# Patient Record
Sex: MALE | Race: WHITE | NOT HISPANIC OR LATINO | URBAN - METROPOLITAN AREA
[De-identification: names, ages, dates, MRNs, and addresses within clinical notes are randomized per-mention and may not be internally consistent; named-entity substitution may affect disease eponyms.]

---

## 2020-02-10 ENCOUNTER — OFFICE VISIT (OUTPATIENT)
Dept: FAMILY MEDICINE CLINIC | Facility: CLINIC | Age: 60
End: 2020-02-10

## 2020-02-10 VITALS
BODY MASS INDEX: 34.91 KG/M2 | HEIGHT: 60 IN | HEART RATE: 68 BPM | OXYGEN SATURATION: 97 % | RESPIRATION RATE: 20 BRPM | WEIGHT: 177.8 LBS | TEMPERATURE: 98.4 F | SYSTOLIC BLOOD PRESSURE: 130 MMHG | DIASTOLIC BLOOD PRESSURE: 80 MMHG

## 2020-02-10 DIAGNOSIS — Z79.899 HIGH RISK MEDICATION USE: Primary | ICD-10-CM

## 2020-02-10 DIAGNOSIS — J44.9 CHRONIC OBSTRUCTIVE PULMONARY DISEASE, UNSPECIFIED COPD TYPE (HCC): ICD-10-CM

## 2020-02-10 DIAGNOSIS — Z82.49 FAMILY HISTORY OF EARLY CAD: ICD-10-CM

## 2020-02-10 DIAGNOSIS — F98.8 ATTENTION DEFICIT DISORDER, UNSPECIFIED HYPERACTIVITY PRESENCE: ICD-10-CM

## 2020-02-10 DIAGNOSIS — F17.210 CIGARETTE SMOKER: ICD-10-CM

## 2020-02-10 DIAGNOSIS — J30.9 ALLERGIC RHINITIS, UNSPECIFIED SEASONALITY, UNSPECIFIED TRIGGER: ICD-10-CM

## 2020-02-10 PROCEDURE — 99203 OFFICE O/P NEW LOW 30 MIN: CPT | Performed by: FAMILY MEDICINE

## 2020-02-10 RX ORDER — NAPROXEN 500 MG/1
500 TABLET ORAL
COMMUNITY
Start: 2015-05-16 | End: 2020-02-10

## 2020-02-10 RX ORDER — AZELASTINE 1 MG/ML
2 SPRAY, METERED NASAL
COMMUNITY
Start: 2019-05-10 | End: 2020-02-10

## 2020-02-10 RX ORDER — ALBUTEROL SULFATE 90 UG/1
2 AEROSOL, METERED RESPIRATORY (INHALATION)
COMMUNITY
Start: 2019-05-10 | End: 2020-02-10

## 2020-02-10 RX ORDER — FLUTICASONE PROPIONATE 50 MCG
2 SPRAY, SUSPENSION (ML) NASAL
COMMUNITY
Start: 2019-05-10 | End: 2020-02-10

## 2020-02-10 RX ORDER — SILDENAFIL CITRATE 20 MG/1
TABLET ORAL
COMMUNITY
Start: 2018-04-16 | End: 2020-02-10

## 2020-02-10 NOTE — PROGRESS NOTES
HPI  Chace Aguilar is a 59 y.o. male who is here to establish a new primary care physician.  Mainly requesting prescription for Adderall because of attention deficit disorder.  Apparently took son's Adderall and noticed significant improvement in ability to focus.  Has longstanding history of neck and back problems but has declined use of narcotic pain medication etc.  Health history form completed and reviewed.  This includes family history of mother having triple bypass surgery and pacemaker.  Patient has changed over to electronic cigarettes 1 to 2 weeks ago because of increasing congestion.  Attempted to review records from [facility] also and may try to get medications from previous physician.      Review of Systems      Past Medical History:   Diagnosis Date   • Anxiety    • Kidney stone        Past Surgical History:   Procedure Laterality Date   • KNEE ARTHROSCOPY     • ROTATOR CUFF REPAIR Bilateral     Left 3/20/1998 and Right 2/13/2002       Family History   Problem Relation Age of Onset   • Heart attack Mother    • Other Mother         Pacemaker and Triple Bypass   • Alcohol abuse Father        Social History     Socioeconomic History   • Marital status: Unknown     Spouse name: Not on file   • Number of children: Not on file   • Years of education: Not on file   • Highest education level: Not on file   Tobacco Use   • Smoking status: Current Every Day Smoker     Packs/day: 1.50     Types: Electronic Cigarette   • Smokeless tobacco: Never Used   Substance and Sexual Activity   • Alcohol use: Never     Frequency: Never   • Drug use: Never         Physical Exam   Constitutional: He is oriented to person, place, and time. He appears well-developed and well-nourished. No distress.   HENT:   Head: Normocephalic.   Nose: Nose normal.   Mouth/Throat: Oropharynx is clear and moist. No oropharyngeal exudate.   Eyes: Pupils are equal, round, and reactive to light. Conjunctivae are normal.   Neck: Normal range  of motion. No JVD present. No thyromegaly present.   Cardiovascular: Normal rate, normal heart sounds and intact distal pulses.   Pulmonary/Chest: Effort normal. No respiratory distress. He has wheezes. He has rhonchi. He has no rales.   Abdominal: He exhibits no distension. There is no tenderness.   Musculoskeletal: Normal range of motion. He exhibits no edema or deformity.   Neurological: He is alert and oriented to person, place, and time. No sensory deficit. Coordination normal.   Skin: Skin is warm and dry.   Psychiatric: He has a normal mood and affect. His behavior is normal. Judgment and thought content normal.   Vitals reviewed.        Assessment/Plan    Chace was seen today for add.    Diagnoses and all orders for this visit:    High risk medication use  -     ECG 12 Lead; Future    Family history of early CAD  -     ECG 12 Lead; Future    Chronic obstructive pulmonary disease, unspecified COPD type (CMS/HCC)  -     XR Chest PA & Lateral; Future    Cigarette smoker  -     ECG 12 Lead; Future  -     XR Chest PA & Lateral; Future    Allergic rhinitis, unspecified seasonality, unspecified trigger    Attention deficit disorder, unspecified hyperactivity presence        Patient here mostly requesting medication for attention deficit disorder.  Apparently took son's prescription and noted a significant improvement in his ability to concentrate.  Risks discussed especially regarding cardiac issues at his age with smoking history etc.  Will attempt to get more information including [place] etc.  Will need to address contract and other issues.  First will start with chest x-ray and EKG.  Strongly recommend discontinuing cigarette use.  Other risk factors will need to be discussed.  Once decision made will follow-up in 1 month to closely monitor response etc.    This note includes information entered using a voice recognition dictation system.  Though reviewed, some nonsensible errors may remain.

## 2020-12-04 ENCOUNTER — OFFICE VISIT (OUTPATIENT)
Dept: FAMILY MEDICINE CLINIC | Facility: CLINIC | Age: 60
End: 2020-12-04

## 2020-12-04 VITALS
SYSTOLIC BLOOD PRESSURE: 110 MMHG | BODY MASS INDEX: 23.91 KG/M2 | HEIGHT: 70 IN | HEART RATE: 68 BPM | TEMPERATURE: 97.3 F | DIASTOLIC BLOOD PRESSURE: 68 MMHG | OXYGEN SATURATION: 97 % | RESPIRATION RATE: 16 BRPM | WEIGHT: 167 LBS

## 2020-12-04 DIAGNOSIS — G89.29 CHRONIC LEFT SHOULDER PAIN: Primary | ICD-10-CM

## 2020-12-04 DIAGNOSIS — M25.512 CHRONIC LEFT SHOULDER PAIN: Primary | ICD-10-CM

## 2020-12-04 PROCEDURE — 99213 OFFICE O/P EST LOW 20 MIN: CPT | Performed by: FAMILY MEDICINE

## 2020-12-04 PROCEDURE — 90686 IIV4 VACC NO PRSV 0.5 ML IM: CPT | Performed by: FAMILY MEDICINE

## 2020-12-04 PROCEDURE — G0008 ADMIN INFLUENZA VIRUS VAC: HCPCS | Performed by: FAMILY MEDICINE

## 2020-12-04 NOTE — PROGRESS NOTES
HPI  Chace Aguilar is a 60 y.o. male who is here for shoulder pain for the last several weeks.  Requesting referral back to previous orthopedist.  Apparently had previous shoulder surgery.  Also discussed health screening and strongly recommended discontinuing cigarette use.  Especially in view of cardiac history in the family.      Review of Systems   Respiratory: Negative for shortness of breath.    Cardiovascular: Negative for chest pain and palpitations.   All other systems reviewed and are negative.        Past Medical History:   Diagnosis Date   • Anxiety    • Kidney stone        Past Surgical History:   Procedure Laterality Date   • KNEE ARTHROSCOPY     • ROTATOR CUFF REPAIR Bilateral     Left 3/20/1998 and Right 2/13/2002       Family History   Problem Relation Age of Onset   • Heart attack Mother    • Other Mother         Pacemaker and Triple Bypass   • Alcohol abuse Father        Social History     Socioeconomic History   • Marital status: Unknown     Spouse name: Not on file   • Number of children: Not on file   • Years of education: Not on file   • Highest education level: Not on file   Tobacco Use   • Smoking status: Current Every Day Smoker     Packs/day: 1.50     Years: 40.00     Pack years: 60.00     Types: Electronic Cigarette     Start date: 12/4/1980   • Smokeless tobacco: Never Used   Substance and Sexual Activity   • Alcohol use: Never     Frequency: Never   • Drug use: Never   • Sexual activity: Defer       Vitals:    12/04/20 1506   BP: 110/68   Pulse: 68   Resp: 16   Temp: 97.3 °F (36.3 °C)   SpO2: 97%        Body mass index is 24.31 kg/m².      Physical Exam  Vitals signs and nursing note reviewed.   Constitutional:       General: He is not in acute distress.     Appearance: He is well-developed.   HENT:      Head: Normocephalic and atraumatic.   Eyes:      Conjunctiva/sclera: Conjunctivae normal.      Pupils: Pupils are equal, round, and reactive to light.   Neck:      Musculoskeletal:  Normal range of motion.      Thyroid: No thyromegaly.   Cardiovascular:      Rate and Rhythm: Normal rate and regular rhythm.      Heart sounds: Normal heart sounds.   Pulmonary:      Effort: Pulmonary effort is normal. No respiratory distress.      Breath sounds: Normal breath sounds.   Abdominal:      General: There is no distension.      Palpations: Abdomen is soft. There is no mass.      Tenderness: There is no abdominal tenderness.      Hernia: No hernia is present.   Musculoskeletal:         General: No tenderness or deformity.      Left shoulder: He exhibits decreased range of motion and pain.   Lymphadenopathy:      Cervical: No cervical adenopathy.   Skin:     General: Skin is warm and dry.      Coloration: Skin is not pale.      Findings: No rash.   Neurological:      Mental Status: He is alert and oriented to person, place, and time.      Motor: No abnormal muscle tone.      Coordination: Coordination normal.   Psychiatric:         Mood and Affect: Mood normal.         Behavior: Behavior normal.         Thought Content: Thought content normal.         Judgment: Judgment normal.           Assessment/Plan    Diagnoses and all orders for this visit:    1. Chronic left shoulder pain (Primary)  -     Ambulatory Referral to Orthopedic Surgery    Other orders  -     FluLaval Quad >6 Months ([id number])      Patient here mostly requesting referral to previous orthopedist because of recurrence of left shoulder pain which restricts activity.  Discussed health maintenance issues and especially discontinuing cigarette use.  Also colon cancer and lung cancer screening discussed.  Information given to the patient regarding Cologuard.  Unclear if followed Medicare but if his recommend Medicare wellness evaluation to discuss health maintenance issues in more detail.    This note includes information entered using a voice recognition dictation system.  Though reviewed, some nonsensible errors may remain.

## 2020-12-07 ENCOUNTER — TELEPHONE (OUTPATIENT)
Dept: FAMILY MEDICINE CLINIC | Facility: CLINIC | Age: 60
End: 2020-12-07

## 2020-12-07 DIAGNOSIS — M25.512 CHRONIC LEFT SHOULDER PAIN: Primary | ICD-10-CM

## 2020-12-07 DIAGNOSIS — G89.29 CHRONIC LEFT SHOULDER PAIN: Primary | ICD-10-CM

## 2020-12-07 NOTE — TELEPHONE ENCOUNTER
PATIENT'S  CAREGIVER KENAN CALLED TO INFORM DR ACOSTA THAT PATIENT WOULD LIKE TO GET A REFERRAL TO [facility]     [address] [facility] [address] [place] [address]    P: [phone]

## 2021-03-16 ENCOUNTER — BULK ORDERING (OUTPATIENT)
Dept: CASE MANAGEMENT | Facility: OTHER | Age: 61
End: 2021-03-16

## 2021-03-16 DIAGNOSIS — Z23 IMMUNIZATION DUE: ICD-10-CM

## 2021-11-16 ENCOUNTER — TELEPHONE (OUTPATIENT)
Dept: CASE MANAGEMENT | Facility: OTHER | Age: 61
End: 2021-11-16

## 2021-11-16 NOTE — TELEPHONE ENCOUNTER
Call placed to pt to schedule AWV. Unable to schedule at this time but will call the office to get this scheduled.